# Patient Record
Sex: FEMALE | Race: WHITE | NOT HISPANIC OR LATINO | Employment: FULL TIME | URBAN - NONMETROPOLITAN AREA
[De-identification: names, ages, dates, MRNs, and addresses within clinical notes are randomized per-mention and may not be internally consistent; named-entity substitution may affect disease eponyms.]

---

## 2020-03-03 ENCOUNTER — OFFICE VISIT (OUTPATIENT)
Dept: PSYCHIATRY | Facility: CLINIC | Age: 55
End: 2020-03-03

## 2020-03-03 DIAGNOSIS — F41.1 GENERALIZED ANXIETY DISORDER: ICD-10-CM

## 2020-03-03 DIAGNOSIS — F33.2 SEVERE EPISODE OF RECURRENT MAJOR DEPRESSIVE DISORDER, WITHOUT PSYCHOTIC FEATURES (HCC): ICD-10-CM

## 2020-03-03 DIAGNOSIS — F43.10 POST TRAUMATIC STRESS DISORDER (PTSD): Primary | ICD-10-CM

## 2020-03-03 PROCEDURE — 90791 PSYCH DIAGNOSTIC EVALUATION: CPT | Performed by: COUNSELOR
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
## 2020-04-09 ENCOUNTER — TELEMEDICINE (OUTPATIENT)
Dept: PSYCHIATRY | Facility: CLINIC | Age: 55
End: 2020-04-09

## 2020-04-09 DIAGNOSIS — F43.10 POST TRAUMATIC STRESS DISORDER (PTSD): Primary | ICD-10-CM

## 2020-04-09 DIAGNOSIS — F33.2 SEVERE EPISODE OF RECURRENT MAJOR DEPRESSIVE DISORDER, WITHOUT PSYCHOTIC FEATURES (HCC): ICD-10-CM

## 2020-04-09 DIAGNOSIS — F41.1 GENERALIZED ANXIETY DISORDER: ICD-10-CM

## 2020-04-09 PROCEDURE — 90837 PSYTX W PT 60 MINUTES: CPT | Performed by: COUNSELOR

## 2020-04-09 NOTE — PROGRESS NOTES
"    PROGRESS NOTE  Data:  Patricia Isabel came in 4/9/2020 for her regularly scheduled therapy session starting at 3 PM and ending at 4 PM, with Shikha Pina [facility].    This provider is located at [facility], at [facility], [address] [place] [address].  The provider identified herself as well as her credentials.  The patient reported being in a confidential setting by herself, using her phone due to problems with video connection at this time.  The patient's condition being diagnosed/treated is appropriate for telemedicine.  The patient gave consent to use the telephone, and when consent is given they understand that the consent allows for patient identifiable information to be sent to a third party as needed.  They may refuse phone sessions at any time.  The electronic data is encrypted and password protected, and the patient has been advised of the potential risk to privacy notwithstanding such measures.     (Scales based on 0 - 10 with 10 being the worst)  Depression: 5 Anxiety: 5     HPI:   Patient reported being in the hospital for pneumonia for 5 days since her last session.  She has still not been released to work due to concerns about her lungs with the recent pandemic.  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  Patient's  has not been able to come home even though he is only 10 to 15 minutes away caring for his mother and stepfather and handicapped brother.  Patient's daughter lives with her still and has been a comfort, \"my back bone with this, she gets me out and I could not have made it through without her.\"  Patient's daughter works at the same factory as an  and was the 1 to suggest that she come and worked there a couple of years ago when she was struggling with her mental health.  At the time patient had " "gotten to the point where she could not enjoy her grandchildren and did not want to be around her own children.  \"I did not like my state of mind.  I recognized, ' this is not me.'\"  Patient appreciates the opportunity to have accommodations for her back pain at the factory.  It had been helpful to time to get out and be sociable again, returning back to [employer], going out to eat as she had not gone shopping for a long time.  \"My [employer] family kept praying for me and 1 of my 's relatives shared things he had learned in therapy himself that were encouraging.  Patient shared an experience she had a couple of years ago when her  got acquainted with a man and hired him to help with some projects around their home.  \"He planted convincing ideas about my  creating uncertainty and suspicion.  He was a troubled oscar and it about destroyed our marriage.  I ended up getting a restraining order and he ended up in intermediate due to child support issues.  Recently he got out of intermediate and a few days ago he was hit by a car and killed while he was walking down the road.  It has been very upsetting to me even though I do not have any guilt since I was not the troublemaker.  But I still feel bad and may never know whether he ever made things right.    Patient does feel stressed about the pandemic restrictions as she cannot visit with her loved ones and get out and about.  She also faces some uncertainty about her finances.  Patient is keeping herself busy and spending time outdoors in the sunshine.  \"I keep busy to avoid negative thoughts I read my Bible and books and cook but I am getting bored.  [place]  Her sister in [place] calls and it is helpful not to feel alone.  Her daughter sometimes takes her out for rides.  Patient reports her Lexapro has not been working after taking it for the past 5 or 6 years.    Clinical Maneuvering/Intervention:  Assisted patient in processing " above session content; acknowledged and normalized patient’s thoughts, feelings, and concerns.  Shared the introduction to the 10 irrational thought patterns and cognitive behavioral therapy.  Patient reported this was very helpful and she was mailed the handouts that we had gone over in the session.    Allowed patient to freely discuss issues without interruption or judgment. Provided safe, confidential environment to facilitate the development of positive therapeutic relationship and encourage open, honest communication. Assisted patient in identifying risk factors which would indicate the need for higher level of care including thoughts to harm self or others and/or self-harming behavior and encouraged patient to contact this office, call 911, or present to the nearest emergency room should any of these events occur. Discussed crisis intervention services and means to access.  Patient adamantly and convincingly denies current suicidal or homicidal ideation or perceptual disturbance.        Assessment     Patient is stable using positive coping skills.    Diagnosis:   Encounter Diagnoses   Name Primary?   • Post traumatic stress disorder (PTSD) Yes   • Severe episode of recurrent major depressive disorder, without psychotic features (CMS/HCC)    • Generalized anxiety disorder        Post traumatic stress disorder (PTSD) [F43.10]    Mental Status Exam  Hygiene: Unknown due to phone session  Dress:  Unknown due to phone session  Attitude:  Cooperative  Motor Activity:  Unknown due to phone session  Speech:  Normal  Mood:  within normal limits  Affect: Unknown due to phone session  Thought Processes:  Goal directed and Linear  Thought Content:  normal  Suicidal Thoughts:  denies  Homicidal Thoughts:  denies  Crisis Safety Plan: yes, to come to the emergency room.  Hallucinations:  denies          Patient's Support Network Includes:  , children and extended family    Progress toward goal: Not at  goal    Functional Status: Mild impairment     Prognosis: Good with Ongoing Treatment       Plan         Patient will adhere to medication regimen as prescribed and report any side effects. Patient will contact this office, call 911 or present to the nearest emergency room should suicidal or homicidal ideations occur. Provide Cognitive Behavioral Therapy and Integrative Therapy to improve functioning, maintain stability, and avoid decompensation and the need for higher level of care.            Return in about 2 years (around 4/9/2022).            This document electronically signed by Shikha Pina, ANGELICA, NCC, CSAT  April 9, 2020 16:37    Plan

## 2020-07-31 ENCOUNTER — OFFICE VISIT (OUTPATIENT)
Dept: PSYCHIATRY | Facility: CLINIC | Age: 55
End: 2020-07-31

## 2020-07-31 DIAGNOSIS — F41.1 GENERALIZED ANXIETY DISORDER: ICD-10-CM

## 2020-07-31 DIAGNOSIS — F43.10 POST TRAUMATIC STRESS DISORDER (PTSD): Primary | ICD-10-CM

## 2020-07-31 DIAGNOSIS — F33.1 MAJOR DEPRESSIVE DISORDER, RECURRENT EPISODE, MODERATE (HCC): ICD-10-CM

## 2020-07-31 PROCEDURE — 90837 PSYTX W PT 60 MINUTES: CPT | Performed by: COUNSELOR
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
## 2020-11-19 ENCOUNTER — OFFICE VISIT (OUTPATIENT)
Dept: PSYCHIATRY | Facility: CLINIC | Age: 55
End: 2020-11-19

## 2020-11-19 DIAGNOSIS — F43.10 POST TRAUMATIC STRESS DISORDER (PTSD): Primary | ICD-10-CM

## 2020-11-19 DIAGNOSIS — F33.1 MAJOR DEPRESSIVE DISORDER, RECURRENT EPISODE, MODERATE (HCC): ICD-10-CM

## 2020-11-19 DIAGNOSIS — F41.1 GENERALIZED ANXIETY DISORDER: ICD-10-CM

## 2020-11-19 PROCEDURE — 90837 PSYTX W PT 60 MINUTES: CPT | Performed by: COUNSELOR

## 2020-11-24 NOTE — PROGRESS NOTES
"    PROGRESS NOTE  Data:  Patricia Isabel came in 11/19/2020 for her regularly scheduled therapy session starting at 1:30 PM and ending at 2:30 PM, with Shikha Pina [facility].     (Scales based on 0 - 10 with 10 being the worst)  Depression: 5 Anxiety: 5     HPI:   Patient reported she has been hospitalized for 4 to 5 days recently due to COPD and emphysema.  She is still smoking 1/2 pack of cigarettes a day.  \"It is hard to quit smoking \"especially when I have anxiety and depression is the first thing I want.\".    Patient reported her relationship with her  is improving.  Previously she rated the connection with her  at 2 out of 10 with 10 being the closest and today it is 5 out of 10.  \"He is somewhat more considered.  Patient is somewhat concerned about her  flirting.  \"I am working to get that trust back.\"  \"My mind is wild with me.  I have got to be truthful with myself.  I realize I have got more and I cannot open up to anyone.  There are other issues I have never opened up to anyone.\"    \"I have heard the words 'crazy and bipolar' thrown at me.\"  \"I have got a behavioral problem and I get angry at myself.\"  Patient's 7-year-old grandson pointed out some of her behaviors and she realized that kids notice and it is not a secret.  She admitted that she double checks things like flipping lights on.  \"It takes forever to go to sleep at night, and all wanted and I have been for years.  I need help because I feel I am going crazy.  It is really getting to me.\"  She also has to have a small light on at night in order to sleep.  Patient admits she has bottled this up and not talk to anyone about it as she fears being judged.    Clinical Maneuvering/Intervention:  Assisted patient in processing above session content; acknowledged and normalized patient’s thoughts, feelings, and concerns.     Encouraged patient to talk to her  and daughter who lives with them about this behavior that bothers " her.  Suggested she encourage them to gently remind her that she is already checked on things and that it safe to go to bed.  Discussed this is a trauma reaction giving her the illusion that she is safe.  Discussed that patient is breaking up and strong enough to be able to protect herself when she could not when she was younger and smaller and weaker.    Allowed patient to freely discuss issues without interruption or judgment. Provided safe, confidential environment to facilitate the development of positive therapeutic relationship and encourage open, honest communication. Assisted patient in identifying risk factors which would indicate the need for higher level of care including thoughts to harm self or others and/or self-harming behavior and encouraged patient to contact this office, call 911, or present to the nearest emergency room should any of these events occur. Discussed crisis intervention services and means to access.  Patient adamantly and convincingly denies current suicidal or homicidal ideation or perceptual disturbance.        Assessment     Patient patient disclosed checking behaviors.    Diagnosis:   Encounter Diagnoses   Name Primary?   • Post traumatic stress disorder (PTSD) Yes   • Generalized anxiety disorder    • Major depressive disorder, recurrent episode, moderate (CMS/HCC)        Post traumatic stress disorder (PTSD) [F43.10]    Mental Status Exam  Hygiene:  good  Dress:  casual  Attitude:  Cooperative  Motor Activity:  Appropriate  Speech:  Normal  Mood:  decreased range  Affect:  calm and pleasant  Thought Processes:  Goal directed and Linear  Thought Content:  normal  Suicidal Thoughts:  denies  Homicidal Thoughts:  denies  Crisis Safety Plan: yes, to come to the emergency room.  Hallucinations:  denies          Patient's Support Network Includes:  , daughter and extended family    Progress toward goal: Not at goal    Functional Status: Mild impairment     Prognosis: Good with  Ongoing Treatment       Plan         Patient will adhere to medication regimen as prescribed and report any side effects. Patient will contact this office, call 911 or present to the nearest emergency room should suicidal or homicidal ideations occur. Provide Cognitive Behavioral Therapy and Integrative Therapy to improve functioning, maintain stability, and avoid decompensation and the need for higher level of care.            Return in about 2 weeks (around 12/3/2020).            This document electronically signed by Shikha Pina, ANGELICA, NCC, CSAT  November 24, 2020 15:49 EST    Plan

## 2021-01-27 ENCOUNTER — OFFICE VISIT (OUTPATIENT)
Dept: PSYCHIATRY | Facility: CLINIC | Age: 56
End: 2021-01-27

## 2021-01-27 DIAGNOSIS — F42.9 OBSESSIVE-COMPULSIVE DISORDER WITH GOOD OR FAIR INSIGHT: ICD-10-CM

## 2021-01-27 DIAGNOSIS — F41.1 GENERALIZED ANXIETY DISORDER: ICD-10-CM

## 2021-01-27 DIAGNOSIS — F43.10 POST TRAUMATIC STRESS DISORDER (PTSD): Primary | ICD-10-CM

## 2021-01-27 DIAGNOSIS — F33.1 MAJOR DEPRESSIVE DISORDER, RECURRENT EPISODE, MODERATE (HCC): ICD-10-CM

## 2021-01-27 PROCEDURE — 90837 PSYTX W PT 60 MINUTES: CPT | Performed by: COUNSELOR

## 2021-01-27 NOTE — PROGRESS NOTES
"    PROGRESS NOTE  Data:  Patricia Isabel came in 1/27/2021 for her regularly scheduled therapy session starting at 11 AM and ending at 12 PM, with Shikha Pina [facility].     (Scales based on 0 - 10 with 10 being the worst)  Depression: 5 Anxiety: 5     HPI:   Patient reported the family decided to have a smaller group over to celebrate Hanane this year due to difficulty with her lungs.  Normally they have a large gathering.    Patient reported she still catches herself wanting to keep checking that the lights are turned off but she has been able to discuss it openly with her family and they admitted they had feared the same thing because they did not want to upset her.  \"I can talk myself down and I have made a big improvement.\"    Patient reported that things are going better with her  and she is quite sure he is not continuing to work with other women.  However \"he has never admitted and he has a way of blaming me and arguing saying, 'I told you once that I did not.'\"  Since she is not working or spending more time together.    Patient reported she has been spending a lot of time in bed sleeping too much.    Patient asked to review the diagnostic criteria for PTSD and OCD.    Patient plans to purchase the book \"When Panic Attacks\" by Dr. Kishan Thayer.    Clinical Maneuvering/Intervention:  Assisted patient in processing above session content; acknowledged and normalized patient’s thoughts, feelings, and concerns.     Discussed the importance of full disclosure when it comes to her  needing to regain her trust and not that she needs to work hard to trust him.  Discussed what a boundary would look like where she encourages him to make his choice what he would do but that she will have to protect herself depending on what he chooses and may have to distance herself.  Discussed depression as a monster as a metaphor, and the depression is trying to kill her.  Shared the importance of having adequate " social interaction to get her emotional needs met to fight depression.  Reviewed the diagnostic criteria and patient meets criteria for both OCD and PTSD.  Patient was comfortable that she does not have the most serious case of OCD.    Allowed patient to freely discuss issues without interruption or judgment. Provided safe, confidential environment to facilitate the development of positive therapeutic relationship and encourage open, honest communication. Assisted patient in identifying risk factors which would indicate the need for higher level of care including thoughts to harm self or others and/or self-harming behavior and encouraged patient to contact this office, call 911, or present to the nearest emergency room should any of these events occur. Discussed crisis intervention services and means to access.  Patient adamantly and convincingly denies current suicidal or homicidal ideation or perceptual disturbance.        Assessment     Patient is stable making steady progress.    Diagnosis:   Encounter Diagnoses   Name Primary?   • Post traumatic stress disorder (PTSD) Yes   • Obsessive-compulsive disorder with good or fair insight    • Generalized anxiety disorder    • Major depressive disorder, recurrent episode, moderate (CMS/HCC)        Post traumatic stress disorder (PTSD) [F43.10]    Mental Status Exam  Hygiene:  good  Dress:  casual  Attitude:  Cooperative  Motor Activity:  Appropriate  Speech:  Normal  Mood:  within normal limits  Affect:  calm and pleasant  Thought Processes:  Goal directed and Linear  Thought Content:  normal  Suicidal Thoughts:  denies  Homicidal Thoughts:  denies  Crisis Safety Plan: yes, to come to the emergency room.  Hallucinations:  denies          Patient's Support Network Includes:  , daughter and extended family    Progress toward goal: Not at goal    Functional Status: Moderate impairment     Prognosis: Good with Ongoing Treatment       Plan         Patient will adhere  to medication regimen as prescribed and report any side effects. Patient will contact this office, call 911 or present to the nearest emergency room should suicidal or homicidal ideations occur. Provide Cognitive Behavioral Therapy and Integrative Therapy to improve functioning, maintain stability, and avoid decompensation and the need for higher level of care.            Return in about 2 weeks (around 2/10/2021).            This document electronically signed by Shikha Pina, ANGELICA, NCC, CSAT  January 27, 2021 14:19 EST    Plan

## 2021-02-19 ENCOUNTER — TELEMEDICINE (OUTPATIENT)
Dept: PSYCHIATRY | Facility: CLINIC | Age: 56
End: 2021-02-19

## 2021-02-19 DIAGNOSIS — F33.1 MAJOR DEPRESSIVE DISORDER, RECURRENT EPISODE, MODERATE (HCC): ICD-10-CM

## 2021-02-19 DIAGNOSIS — F42.9 OBSESSIVE-COMPULSIVE DISORDER WITH GOOD OR FAIR INSIGHT: ICD-10-CM

## 2021-02-19 DIAGNOSIS — F41.1 GENERALIZED ANXIETY DISORDER: ICD-10-CM

## 2021-02-19 DIAGNOSIS — F43.10 POST TRAUMATIC STRESS DISORDER (PTSD): Primary | ICD-10-CM

## 2021-02-19 PROCEDURE — 90837 PSYTX W PT 60 MINUTES: CPT | Performed by: COUNSELOR

## 2021-02-23 NOTE — PROGRESS NOTES
"    PROGRESS NOTE  Data:  Patricia Isabel came in 2/23/2021 for her regularly scheduled therapy session starting at 30 p.m. and ending at 2:30 PM, with Shikha Pina [facility].    This provider is located at the [facility].  The patient was unable to be seen through MyChart video visit through [facility] for today's encounter because she does not have Internet connection in her home.  The patient's condition being diagnosed/treated is appropriate for telemedicine.  The patient consented to be seen remotely and when consent was given understood that the consent allows for patient identifiable information to be sent to third-party as needed.  Patient may refuse to be seen remotely at any time.  The electronic data is encrypted and password protected, and the patient has been advised of the potential risk to privacy notwithstanding such measures.     (Scales based on 0 - 10 with 10 being the worst)  Depression: 5 Anxiety: 6     HPI:   Patient reported, \"it is hard being stuck in the house due to COVID.\"  \"It is hard to breathe with the mask, I get a smothering feeling.\"  Patient suffers COPD, and emphysema.  She was diagnosed 3 years ago and is still smoking and trying to quit.  \"I want to smoke when I get upset or nervous.\"  Patient feels anxious because of concerns with her lung condition.  She is also stressed because the insurance she was able to get due to the cares act and it will run out next month.  Her  is disabled and on Medicare.  She reports a financial strain.    \"I am more of a summer person, the summertime sunshine helps me.  I am out when it is warm.  Depression is always worse in the wintertime.\"  Patient lives where there is very little sunshine [place].    Patient feels needed to cook at home.  \"I love spending time with my grandkids.  They are ages 12 and 16 months, 6, 7 and 12 years old.  \"They enjoyed going to [employer] but for a whole year the [employer]ism is been " "open/closed and the kids really miss it.  \"I listen every night to a 's teaching that is live on the Internet and really enjoy that.\"    Patient reports that her  is continuing to grieve the loss of his mother, father and brother who  within 3 months last year.    Clinical Maneuvering/Intervention:  Assisted patient in processing above session content; acknowledged and normalized patient’s thoughts, feelings, and concerns.     Discussed ideas of how patient can get out and about and be safe at the same time like driving around each day and finding a beautiful area where she can enjoy it from her car in the cold weather.  Suggested she might want to read a book or take a walk at her own pace when the weather gets a little warmer.  Encouraged patient to reach out to friends who may be discouraged and lonely to give her a sense of purpose.  Shared ideas of how patient could have [employer] at home with her children and read stories and sings songs and take turns teaching the other children during this time of isolation.  Patient seemed pleased with this idea of how to meet the spiritual needs of her grandchildren.    Allowed patient to freely discuss issues without interruption or judgment. Provided safe, confidential environment to facilitate the development of positive therapeutic relationship and encourage open, honest communication. Assisted patient in identifying risk factors which would indicate the need for higher level of care including thoughts to harm self or others and/or self-harming behavior and encouraged patient to contact this office, call 911, or present to the nearest emergency room should any of these events occur. Discussed crisis intervention services and means to access.  Patient adamantly and convincingly denies current suicidal or homicidal ideation or perceptual disturbance.        Assessment     Patient is struggling with isolation due to the pandemic, her health and winter " weather.    Diagnosis:   Encounter Diagnoses   Name Primary?   • Post traumatic stress disorder (PTSD) Yes   • Obsessive-compulsive disorder with good or fair insight    • Major depressive disorder, recurrent episode, moderate (CMS/HCC)    • Generalized anxiety disorder        Post traumatic stress disorder (PTSD) [F43.10]    Mental Status Exam  Hygiene: Unknown due to phone session  Dress:  Unknown due to phone session  Attitude:  Cooperative  Motor Activity: Unknown due to phone session  Speech:  Normal  Mood:  within normal limits  Affect: Unknown due to phone session  Thought Processes:  Goal directed and Linear  Thought Content:  normal  Suicidal Thoughts:  denies  Homicidal Thoughts:  denies  Crisis Safety Plan: yes, to come to the emergency room.  Hallucinations:  denies          Patient's Support Network Includes:   and extended family    Progress toward goal: Not at goal    Functional Status: Mild impairment     Prognosis: Good with Ongoing Treatment       Plan         Patient will adhere to medication regimen as prescribed and report any side effects. Patient will contact this office, call 911 or present to the nearest emergency room should suicidal or homicidal ideations occur. Provide Cognitive Behavioral Therapy and Integrative Therapy to improve functioning, maintain stability, and avoid decompensation and the need for higher level of care.            Return in about 3 weeks (around 3/12/2021).            This document electronically signed by Shikha Pina, ANGELICA, NCC, CSAT  February 23, 2021 16:49 EST    Plan

## 2021-03-23 ENCOUNTER — BULK ORDERING (OUTPATIENT)
Dept: CASE MANAGEMENT | Facility: OTHER | Age: 56
End: 2021-03-23

## 2021-03-23 DIAGNOSIS — Z23 IMMUNIZATION DUE: ICD-10-CM

## 2021-04-01 ENCOUNTER — TELEMEDICINE (OUTPATIENT)
Dept: PSYCHIATRY | Facility: CLINIC | Age: 56
End: 2021-04-01

## 2021-04-01 DIAGNOSIS — Z63.4 BEREAVEMENT DUE TO LIFE EVENT: Primary | ICD-10-CM

## 2021-04-01 DIAGNOSIS — F41.1 GENERALIZED ANXIETY DISORDER: ICD-10-CM

## 2021-04-01 DIAGNOSIS — F33.1 MAJOR DEPRESSIVE DISORDER, RECURRENT EPISODE, MODERATE (HCC): ICD-10-CM

## 2021-04-01 PROCEDURE — 90837 PSYTX W PT 60 MINUTES: CPT | Performed by: COUNSELOR

## 2021-04-01 SDOH — SOCIAL STABILITY - SOCIAL INSECURITY: DISSAPEARANCE AND DEATH OF FAMILY MEMBER: Z63.4

## 2021-04-01 NOTE — PROGRESS NOTES
"    PROGRESS NOTE  Data:  Patricia Isabel came in 4/1/2021 for her regularly scheduled therapy session starting at 2:30 PM and ending at 3:30 PM, with Shikha Pina [facility].    The provider is located the [facility].  Clinic visit is electronic, conducted by my chart.  The patient is unable to be seen to my chart video visit today because she could not get connected and believes she may have problems due to poor Internet service.  The patient's condition being diagnosed/treated is appropriate for telemedicine.  The patient gave consent for remote phone session when consent was given understood that the consent allows for patient identifiable information to be sent to third-party as needed.  Patient may refuse remote sessions at any time.  The electronic data is encrypted and password protected, and the patient with has been advised of the potential risk to privacy notwithstanding such measures.     (Scales based on 0 - 10 with 10 being the worst)  Depression: 4 Anxiety: 5     HPI:   \"It bothers me not going back to work.  Because of the coronavirus my doctor advised me to stay clear until I had my second dose of the vaccine.  I had a major reaction when I took it.\"  \"Wintertime really bothered me.  My mind would not shut down.  If something is going on, I dwell on it constantly.  Praying helps put my mind at ease.  It is going better.  I am praying a lot and doing things to overcome my fears.\"    \"I do not sleep with the light on anymore.  I prayed and trusted the Lord and I am still staying indoors.  The weather is pretty enough to put a smile on my face.\"    Patient shared that she had recently gotten some devastating news about her son-in-law.  \"It has got me and I have to leave it in the Lord's hands.\"  He worked at a federal prison in [place] and got into some trouble with contraband.  \"My daughter works as a rep for [employer].  She has 3 little kids ages 13, 7 and 1-year-old.  He has been a father to my " "grandson.\"  \"My daughter fears he is not telling the truth that there may be more to the story than him smuggling tobacco products.  It was probably drugs.\"  It is all public now in [place].  He pled guilty, he was advised to as he thinks it might less than a 30-year conviction to only have 15 years.  On the website it listed the charges and included drugs.  Others were involved.  \"We love him to death and cannot believe he has gotten into this mess.  My daughter is the breadwinner and is not fearful financially because of her job.\"  \"She is unsure how to tell the children.  I worry more about 13-year-old hearing it from other people at school.\"  \"My daughter is devastated.  She asked, 'how could he lied to me and destroyed our children?  I told him I will divorce him if I find out he lied.  I told him I will not expose the kids to skilled nursing life.\"      Patient wonders if her son-in-law may have been set up.  \"His partner asked him to meet a woman with some money on his way to work because he was sick.  As soon as her son-in-law arrived at the skilled nursing he was surrounded by police who searched his car.  They did not find drugs in his car.    \"I worry about the grandchildren and how they will be affected.  I asked God to help us through it.  My daughter believes, but is not saved.\"    Clinical Maneuvering/Intervention:  Assisted patient in processing above session content; acknowledged and normalized patient’s thoughts, feelings, and concerns.     Celebrated patient conquering her fear of the dark and practicing sleeping without the light on.  Suggested patient's daughter may reassure her children that she will be there for them and acknowledge that they are all her.    Allowed patient to freely discuss issues without interruption or judgment. Provided safe, confidential environment to facilitate the development of positive therapeutic relationship and encourage open, honest communication. Assisted patient in identifying " risk factors which would indicate the need for higher level of care including thoughts to harm self or others and/or self-harming behavior and encouraged patient to contact this office, call 911, or present to the nearest emergency room should any of these events occur. Discussed crisis intervention services and means to access.  Patient adamantly and convincingly denies current suicidal or homicidal ideation or perceptual disturbance.        Assessment     Patient is stable, making steady progress.    Diagnosis:   Encounter Diagnoses   Name Primary?   • Bereavement due to life event Yes   • Major depressive disorder, recurrent episode, moderate (CMS/HCC)    • Generalized anxiety disorder        Bereavement due to life event [Z63.4]    Mental Status Exam  Hygiene: Unable to assess due to phone session  Dress: Unable to assess due to phone session  Attitude:  Cooperative  Motor Activity:  Unable to assess due to phone session  Speech:  Normal  Mood:  decreased range  Affect: Unable to assess due to phone session  Thought Processes:  Goal directed and Linear  Thought Content:  normal  Suicidal Thoughts:  denies  Homicidal Thoughts:  denies  Crisis Safety Plan: yes, to come to the emergency room.  Hallucinations:  denies          Patient's Support Network Includes:  , daughter and extended family    Progress toward goal: Not at goal    Functional Status: Mild impairment     Prognosis: Good with Ongoing Treatment       Plan         Patient will adhere to medication regimen as prescribed and report any side effects. Patient will contact this office, call 911 or present to the nearest emergency room should suicidal or homicidal ideations occur. Provide Cognitive Behavioral Therapy and Integrative Therapy to improve functioning, maintain stability, and avoid decompensation and the need for higher level of care.            Return in about 3 weeks (around 4/22/2021).            This document electronically signed by  Shikha Pina, [facility], NCC, CSAT  April 1, 2021 15:56 EDT    Plan

## 2021-11-10 ENCOUNTER — OFFICE VISIT (OUTPATIENT)
Dept: PSYCHIATRY | Facility: CLINIC | Age: 56
End: 2021-11-10

## 2021-11-10 DIAGNOSIS — F33.1 MAJOR DEPRESSIVE DISORDER, RECURRENT EPISODE, MODERATE (HCC): ICD-10-CM

## 2021-11-10 DIAGNOSIS — F43.10 POST TRAUMATIC STRESS DISORDER (PTSD): ICD-10-CM

## 2021-11-10 DIAGNOSIS — Z63.4 BEREAVEMENT DUE TO LIFE EVENT: Primary | ICD-10-CM

## 2021-11-10 DIAGNOSIS — F41.1 GENERALIZED ANXIETY DISORDER: ICD-10-CM

## 2021-11-10 PROCEDURE — 90837 PSYTX W PT 60 MINUTES: CPT | Performed by: COUNSELOR

## 2021-11-10 SDOH — SOCIAL STABILITY - SOCIAL INSECURITY: DISSAPEARANCE AND DEATH OF FAMILY MEMBER: Z63.4
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
## 2022-09-08 ENCOUNTER — OFFICE VISIT (OUTPATIENT)
Dept: PSYCHIATRY | Facility: CLINIC | Age: 57
End: 2022-09-08

## 2022-09-08 DIAGNOSIS — F41.1 GENERALIZED ANXIETY DISORDER: Primary | ICD-10-CM

## 2022-09-08 DIAGNOSIS — F42.2 MIXED OBSESSIONAL THOUGHTS AND ACTS: ICD-10-CM

## 2022-09-08 DIAGNOSIS — F43.10 POST TRAUMATIC STRESS DISORDER (PTSD): ICD-10-CM

## 2022-09-08 DIAGNOSIS — F33.1 MAJOR DEPRESSIVE DISORDER, RECURRENT EPISODE, MODERATE: ICD-10-CM

## 2022-09-08 DIAGNOSIS — Z63.4 BEREAVEMENT DUE TO LIFE EVENT: ICD-10-CM

## 2022-09-08 DIAGNOSIS — F90.2 ATTENTION DEFICIT HYPERACTIVITY DISORDER (ADHD), COMBINED TYPE: ICD-10-CM

## 2022-09-08 PROCEDURE — 90837 PSYTX W PT 60 MINUTES: CPT | Performed by: COUNSELOR

## 2022-09-08 SDOH — SOCIAL STABILITY - SOCIAL INSECURITY: DISSAPEARANCE AND DEATH OF FAMILY MEMBER: Z63.4

## 2022-10-04 ENCOUNTER — OFFICE VISIT (OUTPATIENT)
Dept: PSYCHIATRY | Facility: CLINIC | Age: 57
End: 2022-10-04

## 2022-10-04 DIAGNOSIS — F41.1 GENERALIZED ANXIETY DISORDER: Primary | ICD-10-CM

## 2022-10-04 DIAGNOSIS — F43.10 POST TRAUMATIC STRESS DISORDER (PTSD): ICD-10-CM

## 2022-10-04 DIAGNOSIS — F42.2 MIXED OBSESSIONAL THOUGHTS AND ACTS: ICD-10-CM

## 2022-10-04 DIAGNOSIS — F33.1 MAJOR DEPRESSIVE DISORDER, RECURRENT EPISODE, MODERATE: ICD-10-CM

## 2022-10-04 PROCEDURE — 90837 PSYTX W PT 60 MINUTES: CPT | Performed by: COUNSELOR
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
## 2023-02-16 ENCOUNTER — TELEMEDICINE (OUTPATIENT)
Dept: PSYCHIATRY | Facility: CLINIC | Age: 58
End: 2023-02-16
Payer: COMMERCIAL

## 2023-02-16 DIAGNOSIS — F43.10 POST TRAUMATIC STRESS DISORDER (PTSD): ICD-10-CM

## 2023-02-16 DIAGNOSIS — F33.1 MAJOR DEPRESSIVE DISORDER, RECURRENT EPISODE, MODERATE: ICD-10-CM

## 2023-02-16 DIAGNOSIS — Z63.4 BEREAVEMENT DUE TO LIFE EVENT: ICD-10-CM

## 2023-02-16 DIAGNOSIS — F41.1 GENERALIZED ANXIETY DISORDER: Primary | ICD-10-CM

## 2023-02-16 PROCEDURE — 90837 PSYTX W PT 60 MINUTES: CPT | Performed by: COUNSELOR

## 2023-02-16 SDOH — SOCIAL STABILITY - SOCIAL INSECURITY: DISSAPEARANCE AND DEATH OF FAMILY MEMBER: Z63.4
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
